# Patient Record
Sex: FEMALE | Race: WHITE | NOT HISPANIC OR LATINO | URBAN - METROPOLITAN AREA
[De-identification: names, ages, dates, MRNs, and addresses within clinical notes are randomized per-mention and may not be internally consistent; named-entity substitution may affect disease eponyms.]

---

## 2019-06-06 ENCOUNTER — IMPORTED ENCOUNTER (OUTPATIENT)
Dept: URBAN - METROPOLITAN AREA CLINIC 31 | Facility: CLINIC | Age: 34
End: 2019-06-06

## 2019-06-06 PROCEDURE — 92310 CONTACT LENS FITTING OU: CPT

## 2019-06-06 PROCEDURE — 92015 DETERMINE REFRACTIVE STATE: CPT

## 2019-06-06 PROCEDURE — 92004 COMPRE OPH EXAM NEW PT 1/>: CPT

## 2019-06-06 NOTE — PATIENT DISCUSSION
1.  Myope--2. Allergies-- keep clean. 3. Gave copy rx and sample pr of cls--[employer] -3.00-1.25x080/-3.00-0.75x130. Wear DW 30 days. Pt overwears. Pt was last seen 5 yrs ago and was getting cls from internet. Eval 90.4. Gave gls rx5.   RTN 1 yr CE  SP

## 2022-04-02 ASSESSMENT — VISUAL ACUITY
OD_PH: SC 20/50 +1
OS_PH: SC 20/40 -1
OS_CC: 20/200
OD_CC: 20/200

## 2022-04-02 ASSESSMENT — TONOMETRY
OS_IOP_MMHG: 17
OD_IOP_MMHG: 17